# Patient Record
Sex: FEMALE | Race: BLACK OR AFRICAN AMERICAN
[De-identification: names, ages, dates, MRNs, and addresses within clinical notes are randomized per-mention and may not be internally consistent; named-entity substitution may affect disease eponyms.]

---

## 2017-02-15 ENCOUNTER — HOSPITAL ENCOUNTER (EMERGENCY)
Dept: HOSPITAL 62 - ER | Age: 27
Discharge: HOME | End: 2017-02-15
Payer: SELF-PAY

## 2017-02-15 VITALS — DIASTOLIC BLOOD PRESSURE: 57 MMHG | SYSTOLIC BLOOD PRESSURE: 99 MMHG

## 2017-02-15 DIAGNOSIS — J11.1: Primary | ICD-10-CM

## 2017-02-15 DIAGNOSIS — M79.1: ICD-10-CM

## 2017-02-15 DIAGNOSIS — Z88.0: ICD-10-CM

## 2017-02-15 DIAGNOSIS — R50.9: ICD-10-CM

## 2017-02-15 PROCEDURE — 99283 EMERGENCY DEPT VISIT LOW MDM: CPT

## 2017-02-15 PROCEDURE — 87804 INFLUENZA ASSAY W/OPTIC: CPT

## 2017-02-15 NOTE — ER DOCUMENT REPORT
[id number]   [id number]


Mode of Arrival: Ambulatory


Information source: Patient


Notes: 


26 yr old female presents with complaints of body aches, joint pain, fevers and 

cough of 2 day duration. pt notes she did not have influenza vaccine this year 

and works at day care with multiple flu exposures


TRAVEL OUTSIDE OF THE [place] IN LAST 30 DAYS: No





- HPI


Onset: Yesterday


Onset/Duration: Persistent


Quality of pain: Achy


Severity: Mild


Pain Level: 1


Associated symptoms: Body/muscle aches, Nonproductive cough


Exacerbated by: Denies


Relieved by: Denies


Similar symptoms previously: No


Recently seen / treated by doctor: No





- Related Data


Allergies/Adverse Reactions: 


 





Penicillins Allergy (Verified 02/15/17 11:16)


 











Past Medical History





- Social History


Smoking Status: Never Smoker


Cigarette use (# per day): No


Chew tobacco use (# tins/day): No


Smoking Education Provided: No


Frequency of alcohol use: None


Drug Abuse: None


Family History: Reviewed & Not Pertinent, Hypertension


Patient has suicidal ideation: No


Patient has homicidal ideation: No


Renal/ Medical History: Denies: Hx Peritoneal Dialysis


Psychiatric Medical History: Reports: Hx Anxiety


Past Surgical History: Reports: Hx Oral Surgery - wisdom teeth





- Immunizations


Immunizations up to date: Yes


Hx Diphtheria, Pertussis, Tetanus Vaccination: Yes - 2009





Review of Systems





- Review of Systems


Notes: 


REVIEW OF SYSTEMS:


CONSTITUTIONAL :  admits ot fevers 


EENT:  admtis t sore throat 


CARDIOVASCULAR:  Denies chest pain.  Denies palpitations or racing or irregular 

heart beat.  Denies ankle edema.


RESPIRATORY:  Denies cough, cold, or chest congestion.  Denies shortness of 

breath, difficulty breathing, or wheezing.


GASTROINTESTINAL:  Denies abdominal pain or distention.  Denies nausea, vomiting

, or diarrhea.  Denies blood in vomitus, stools, or per rectum.  Denies black, 

tarry stools.  Denies constipation. 


GENITOURINARY:  Denies difficulty urinating, painful urination, burning, 

frequency, blood in urine, or discharge.


FEMALE  GENITOURINARY:  Denies vaginal bleeding, heavy or abnormal periods, 

irregular periods.  Denies vaginal discharge or odor. 


MUSCULOSKELETAL:  admits to body aches 


SKIN:   Denies rash, lesions or sores.


HEMATOLOGIC :   Denies easy bruising or bleeding.


LYMPHATIC:  Denies swollen, enlarged glands.


NEUROLOGICAL:  Denies confusion or altered mental status.  Denies passing out 

or loss of consciousness.  Denies dizziness or lightheadedness.  Denies 

headache.  Denies weakness or paralysis or loss of use of either side.  Denies 

problems with gait or speech.  Denies sensory loss, numbness, or tingling.  

Denies seizures.


PSYCHIATRIC:  Denies anxiety or stress.  Denies depression, suicidal ideation, 

or homicidal ideation.





ALL OTHER SYSTEMS REVIEWED AND NEGATIVE.








Dictation was performed using Dragon voice recognition software 








PHYSICAL EXAMINATION:





GENERAL: Well-appearing, well-nourished and in no acute distress.





HEAD: Atraumatic, normocephalic.





EYES: Pupils equal round and reactive to light, extraocular movements intact, 

conjunctiva are normal.





ENT: Nares patent, oropharynx clear without exudates.  Moist mucous membranes.





NECK: Normal range of motion, supple without lymphadenopathy





LUNGS: Breath sounds clear to auscultation bilaterally and equal.  No wheezes 

rales or rhonchi.





HEART: Regular rate and rhythm without murmurs





ABDOMEN: Soft, nontender, nondistended abdomen.  No guarding, no rebound.  No 

masses appreciated.





Female : deferred





Musculoskeletal: Normal range of motion, no pitting or edema.  No cyanosis.





NEUROLOGICAL: Cranial nerves grossly intact.  Normal speech, normal gait.  

Normal sensory, motor exams





PSYCH: Normal mood, normal affect.





SKIN: Warm, Dry, normal turgor, no rashes or lesions noted.





Physical Exam





- Vital signs


Vitals: 


 











Temp Pulse Resp BP Pulse Ox


 


 98.2 F   95   12   108/60   100 


 


 02/15/17 10:53  02/15/17 10:53  02/15/17 10:53  02/15/17 10:53  02/15/17 10:53














Course





- Re-evaluation


Re-evalutation: 





02/15/17 15:57


Patient is positive for the fluid, given that is within 48 hours I will start 

the patient on Tamiflu and is otherwise stable for discharge








Patient given restrict return precautions











After performing a Medical Screening Examination, I estimate there is LOW risk 

for ACUTE CORONARY SYNDROME, RESPIRATORY FAILURE, SEPSIS OR MENINGITIS, thus I 

consider the discharge disposition reasonable. The patient and I have discussed 

the diagnosis and risks, and we agree with discharging home with close follow-

up. We also discussed returning to the Emergency Department immediately if new 

or worsening symptoms occur. We have discussed the symptoms which are most 

concerning (e.g., changing or worsening pain, trouble swallowing or breathing, 

neck stiffness, fever) that necessitate immediate return.





- Vital Signs


Vital signs: 


 











Temp Pulse Resp BP Pulse Ox


 


 98.3 F   79   20   99/57 L  97 


 


 02/15/17 14:02  02/15/17 14:02  02/15/17 14:02  02/15/17 14:02  02/15/17 14:02














Discharge





- Discharge


Clinical Impression: 


 Influenza, Body aches





Condition: Stable


Disposition: HOME, SELF-CARE


Instructions:  Influenza ([facility]) [id number]


Prescriptions: 


Oseltamivir Phosphate [Tamiflu 75 mg Capsule] 75 mg PO BID #10 capsule


Forms:  Return to Work

## 2017-02-15 NOTE — ER DOCUMENT REPORT
ED Medical Screen (RME)





- General


Stated Complaint: FEVER


Notes: 


25 yo female c/o bodyaches, cough, fever x 3 days.  no flu shot


TRAVEL OUTSIDE OF THE [place] IN LAST 30 DAYS: No





- Related Data


Allergies/Adverse Reactions: 


 





Penicillins Allergy (Verified 02/15/17 11:16)


 











Past Medical History


Psychiatric Medical History: Reports: Hx Anxiety





- Immunizations


Immunizations up to date: Yes


Hx Diphtheria, Pertussis, Tetanus Vaccination: Yes - 2009





Physical Exam





- Vital signs


Vitals: 





 











Temp Pulse Resp BP Pulse Ox


 


 98.2 F   95   12   108/60   100 


 


 02/15/17 10:53  02/15/17 10:53  02/15/17 10:53  02/15/17 10:53  02/15/17 10:53














Course





- Vital Signs


Vital signs: 





 











Temp Pulse Resp BP Pulse Ox


 


 98.2 F   95   12   108/60   100 


 


 02/15/17 10:53  02/15/17 10:53  02/15/17 10:53  02/15/17 10:53  02/15/17 10:53

## 2017-05-01 ENCOUNTER — HOSPITAL ENCOUNTER (OUTPATIENT)
Dept: HOSPITAL 62 - OD | Age: 27
End: 2017-05-01
Attending: OBSTETRICS & GYNECOLOGY
Payer: MEDICAID

## 2017-05-01 DIAGNOSIS — Z30.09: Primary | ICD-10-CM

## 2017-05-01 DIAGNOSIS — Z11.3: ICD-10-CM

## 2017-05-01 DIAGNOSIS — Z11.4: ICD-10-CM

## 2017-05-01 LAB
ADD HIVPANEL?: NO
HIV (1 AND 2) ANTIBODY: NEGATIVE

## 2017-05-01 PROCEDURE — 36415 COLL VENOUS BLD VENIPUNCTURE: CPT

## 2017-05-01 PROCEDURE — 86701 HIV-1ANTIBODY: CPT

## 2017-05-01 PROCEDURE — 86592 SYPHILIS TEST NON-TREP QUAL: CPT

## 2018-03-28 ENCOUNTER — HOSPITAL ENCOUNTER (EMERGENCY)
Dept: HOSPITAL 62 - ER | Age: 28
Discharge: HOME | End: 2018-03-28
Payer: MEDICAID

## 2018-03-28 VITALS — DIASTOLIC BLOOD PRESSURE: 59 MMHG | SYSTOLIC BLOOD PRESSURE: 109 MMHG

## 2018-03-28 DIAGNOSIS — M79.1: ICD-10-CM

## 2018-03-28 DIAGNOSIS — D72.829: ICD-10-CM

## 2018-03-28 DIAGNOSIS — H92.09: ICD-10-CM

## 2018-03-28 DIAGNOSIS — J02.9: Primary | ICD-10-CM

## 2018-03-28 DIAGNOSIS — R50.9: ICD-10-CM

## 2018-03-28 DIAGNOSIS — B34.9: ICD-10-CM

## 2018-03-28 LAB
ADD MANUAL DIFF: NO
BASOPHILS # BLD AUTO: 0.1 10^3/UL (ref 0–0.2)
BASOPHILS NFR BLD AUTO: 0.5 % (ref 0–2)
EOSINOPHIL # BLD AUTO: 0.1 10^3/UL (ref 0–0.6)
EOSINOPHIL NFR BLD AUTO: 1.2 % (ref 0–6)
ERYTHROCYTE [DISTWIDTH] IN BLOOD BY AUTOMATED COUNT: 14.2 % (ref 11.5–14)
HCT VFR BLD CALC: 41.6 % (ref 36–47)
HGB BLD-MCNC: 13.4 G/DL (ref 12–15.5)
LYMPHOCYTES # BLD AUTO: 1.2 10^3/UL (ref 0.5–4.7)
LYMPHOCYTES NFR BLD AUTO: 10.5 % (ref 13–45)
MCH RBC QN AUTO: 26.3 PG (ref 27–33.4)
MCHC RBC AUTO-ENTMCNC: 32.1 G/DL (ref 32–36)
MCV RBC AUTO: 82 FL (ref 80–97)
MONOCYTES # BLD AUTO: 1.1 10^3/UL (ref 0.1–1.4)
MONOCYTES NFR BLD AUTO: 9.9 % (ref 3–13)
NEUTROPHILS # BLD AUTO: 8.7 10^3/UL (ref 1.7–8.2)
NEUTS SEG NFR BLD AUTO: 77.9 % (ref 42–78)
PLATELET # BLD: 194 10^3/UL (ref 150–450)
RBC # BLD AUTO: 5.08 10^6/UL (ref 3.72–5.28)
TOTAL CELLS COUNTED % (AUTO): 100 %
WBC # BLD AUTO: 11.2 10^3/UL (ref 4–10.5)

## 2018-03-28 PROCEDURE — 87070 CULTURE OTHR SPECIMN AEROBIC: CPT

## 2018-03-28 PROCEDURE — 36415 COLL VENOUS BLD VENIPUNCTURE: CPT

## 2018-03-28 PROCEDURE — 85025 COMPLETE CBC W/AUTO DIFF WBC: CPT

## 2018-03-28 PROCEDURE — 87077 CULTURE AEROBIC IDENTIFY: CPT

## 2018-03-28 PROCEDURE — 87880 STREP A ASSAY W/OPTIC: CPT

## 2018-03-28 PROCEDURE — 99283 EMERGENCY DEPT VISIT LOW MDM: CPT

## 2018-03-28 NOTE — ER DOCUMENT REPORT
HPI





- HPI


Pain Level: 5


Context: 





Patient is a healthy 27-year-old female presenting to the ED complaining of 

acute onset of ear pain, sore throat, fever, body aches.  Patient denies cough, 

chest pain, shortness of breath, abdominal pain, nausea or vomiting


Associated Symptoms: Body/muscle aches, Chills, Earache, Fever, Headache, Sore 

throat.  denies: Shortness of breath


Exacerbated by: Denies


Relieved by: Denies


Similar symptoms previously: No


Recently seen / treated by doctor: No





- ROS


Systems Reviewed and Negative: Yes All other systems reviewed and negative





- CONSTITUTIONAL


Constitutional: REPORTS: Fever





- EENT


EENT: REPORTS: Ear Pain





- REPRODUCTIVE


Reproductive: DENIES: Pregnant:





Past Medical History





- General


Information source: Patient





- Social History


Smoking Status: Unknown if Ever Smoked


Frequency of alcohol use: None


Lives with: Family


Family History: Reviewed & Not Pertinent, Hypertension


Patient has suicidal ideation: No


Patient has homicidal ideation: No





- Medical History


Medical History: Negative


Renal/ Medical History: Denies: Hx Peritoneal Dialysis


Psychiatric Medical History: Reports: Hx Anxiety


Past Surgical History: Reports: Hx Oral Surgery - wisdom teeth





- Immunizations


Immunizations up to date: Yes


Hx Diphtheria, Pertussis, Tetanus Vaccination: Yes - 2009





Vertical Provider Document





- CONSTITUTIONAL


Agree With Documented VS: Yes





- INFECTION CONTROL


TRAVEL OUTSIDE OF THE [place] IN LAST 30 DAYS: No





- HEENT


HEENT: Atraumatic, Pharyngeal Tenderness, Pharyngeal Erythema, Tympanic 

Membrane Red - right TM injected





- NECK


Neck: Normal Inspection, Supple





- RESPIRATORY


Respiratory: Breath Sounds Normal, No Respiratory Distress





- CARDIOVASCULAR


Cardiovascular: Regular Rate, Regular Rhythm





- GI/ABDOMEN


Gastrointestinal: Abdomen Soft, Abdomen Non-Tender





- NEURO


Level of Consciousness: Awake, Alert, Appropriate





- DERM


Integumentary: Warm, Dry, No Rash





Course





- Re-evaluation


Re-evalutation: 





03/28/18 18:25


CBC showing mild leukocytosis.  Rapid strep is negative.  History and physical 

are consistent with a viral illness.  Symptomatic treatment will be 

recommended.  Discussed this plan with patient.  Patient agreeable and stable 

for discharge


03/28/18 18:28


Patient eating and drinking here in the emergency department without any 

difficulty





- Vital Signs


Vital signs: 


 











Temp Pulse Resp BP Pulse Ox


 


 99.9 F   88   12   103/51 L  99 


 


 03/28/18 16:50  03/28/18 16:50  03/28/18 16:50  03/28/18 16:50  03/28/18 16:50














- Laboratory


Result Diagrams: 


 03/28/18 17:35








Discharge





- Discharge


Clinical Impression: 


 Viral illness





Condition: Stable


Disposition: HOME, SELF-CARE


Instructions:  Fever ([facility]), Viral Syndrome ([facility])

## 2018-04-18 ENCOUNTER — HOSPITAL ENCOUNTER (EMERGENCY)
Dept: HOSPITAL 62 - ER | Age: 28
Discharge: HOME | End: 2018-04-18
Payer: MEDICAID

## 2018-04-18 VITALS — DIASTOLIC BLOOD PRESSURE: 49 MMHG | SYSTOLIC BLOOD PRESSURE: 103 MMHG

## 2018-04-18 DIAGNOSIS — J35.1: ICD-10-CM

## 2018-04-18 DIAGNOSIS — R09.89: ICD-10-CM

## 2018-04-18 DIAGNOSIS — R09.81: ICD-10-CM

## 2018-04-18 DIAGNOSIS — R05: ICD-10-CM

## 2018-04-18 DIAGNOSIS — J02.9: Primary | ICD-10-CM

## 2018-04-18 DIAGNOSIS — Z88.0: ICD-10-CM

## 2018-04-18 DIAGNOSIS — R09.82: ICD-10-CM

## 2018-04-18 DIAGNOSIS — Z87.891: ICD-10-CM

## 2018-04-18 DIAGNOSIS — J06.9: ICD-10-CM

## 2018-04-18 PROCEDURE — 99283 EMERGENCY DEPT VISIT LOW MDM: CPT

## 2018-04-18 PROCEDURE — 87880 STREP A ASSAY W/OPTIC: CPT

## 2018-04-18 PROCEDURE — 87070 CULTURE OTHR SPECIMN AEROBIC: CPT

## 2018-04-18 PROCEDURE — 87077 CULTURE AEROBIC IDENTIFY: CPT

## 2018-05-15 ENCOUNTER — HOSPITAL ENCOUNTER (EMERGENCY)
Dept: HOSPITAL 62 - ER | Age: 28
Discharge: HOME | End: 2018-05-15
Payer: MEDICAID

## 2018-05-15 VITALS — DIASTOLIC BLOOD PRESSURE: 78 MMHG | SYSTOLIC BLOOD PRESSURE: 122 MMHG

## 2018-05-15 DIAGNOSIS — I49.8: ICD-10-CM

## 2018-05-15 DIAGNOSIS — R07.9: Primary | ICD-10-CM

## 2018-05-15 LAB
ADD MANUAL DIFF: NO
ANION GAP SERPL CALC-SCNC: 15 MMOL/L (ref 5–19)
BASOPHILS # BLD AUTO: 0.1 10^3/UL (ref 0–0.2)
BASOPHILS NFR BLD AUTO: 1.3 % (ref 0–2)
BUN SERPL-MCNC: 15 MG/DL (ref 7–20)
CALCIUM: 9.8 MG/DL (ref 8.4–10.2)
CHLORIDE SERPL-SCNC: 104 MMOL/L (ref 98–107)
CO2 SERPL-SCNC: 26 MMOL/L (ref 22–30)
EOSINOPHIL # BLD AUTO: 0.4 10^3/UL (ref 0–0.6)
EOSINOPHIL NFR BLD AUTO: 7 % (ref 0–6)
ERYTHROCYTE [DISTWIDTH] IN BLOOD BY AUTOMATED COUNT: 14.3 % (ref 11.5–14)
GLUCOSE SERPL-MCNC: 74 MG/DL (ref 75–110)
HCT VFR BLD CALC: 43.8 % (ref 36–47)
HGB BLD-MCNC: 14.2 G/DL (ref 12–15.5)
LYMPHOCYTES # BLD AUTO: 2.2 10^3/UL (ref 0.5–4.7)
LYMPHOCYTES NFR BLD AUTO: 42.7 % (ref 13–45)
MCH RBC QN AUTO: 26.5 PG (ref 27–33.4)
MCHC RBC AUTO-ENTMCNC: 32.4 G/DL (ref 32–36)
MCV RBC AUTO: 82 FL (ref 80–97)
MONOCYTES # BLD AUTO: 0.4 10^3/UL (ref 0.1–1.4)
MONOCYTES NFR BLD AUTO: 6.9 % (ref 3–13)
NEUTROPHILS # BLD AUTO: 2.1 10^3/UL (ref 1.7–8.2)
NEUTS SEG NFR BLD AUTO: 42.1 % (ref 42–78)
PHOSPHATE SERPL-MCNC: 4.3 MG/DL (ref 2.5–4.5)
PLATELET # BLD: 246 10^3/UL (ref 150–450)
POTASSIUM SERPL-SCNC: 4 MMOL/L (ref 3.6–5)
RBC # BLD AUTO: 5.34 10^6/UL (ref 3.72–5.28)
SODIUM SERPL-SCNC: 144.5 MMOL/L (ref 137–145)
TOTAL CELLS COUNTED % (AUTO): 100 %
WBC # BLD AUTO: 5.1 10^3/UL (ref 4–10.5)

## 2018-05-15 PROCEDURE — 93010 ELECTROCARDIOGRAM REPORT: CPT

## 2018-05-15 PROCEDURE — 85025 COMPLETE CBC W/AUTO DIFF WBC: CPT

## 2018-05-15 PROCEDURE — 93005 ELECTROCARDIOGRAM TRACING: CPT

## 2018-05-15 PROCEDURE — 84100 ASSAY OF PHOSPHORUS: CPT

## 2018-05-15 PROCEDURE — 83735 ASSAY OF MAGNESIUM: CPT

## 2018-05-15 PROCEDURE — 84443 ASSAY THYROID STIM HORMONE: CPT

## 2018-05-15 PROCEDURE — 81025 URINE PREGNANCY TEST: CPT

## 2018-05-15 PROCEDURE — 80048 BASIC METABOLIC PNL TOTAL CA: CPT

## 2018-05-15 PROCEDURE — 71046 X-RAY EXAM CHEST 2 VIEWS: CPT

## 2018-05-15 PROCEDURE — 99285 EMERGENCY DEPT VISIT HI MDM: CPT

## 2018-05-15 PROCEDURE — 84484 ASSAY OF TROPONIN QUANT: CPT

## 2018-05-15 PROCEDURE — 36415 COLL VENOUS BLD VENIPUNCTURE: CPT

## 2018-05-15 NOTE — RADIOLOGY REPORT (SQ)
EXAM DESCRIPTION:  CHEST 2 VIEWS



COMPLETED DATE/TIME:  5/15/2018 3:53 pm



REASON FOR STUDY:  chest pain



COMPARISON:  None.



EXAM PARAMETERS:  NUMBER OF VIEWS: two views

TECHNIQUE: Digital Frontal and Lateral radiographic views of the chest acquired.

RADIATION DOSE: NA

LIMITATIONS: none



FINDINGS:  LUNGS AND PLEURA: No opacities, masses or pneumothorax. No pleural effusion.  Small nodula
r density is identified projected in the right mid hemithorax presumably representing a nipple shadow
.  If clinically warranted an oblique view of the chest or repeat PA chest with nipple markers is rec
ommended.

MEDIASTINUM AND HILAR STRUCTURES: No masses or contour abnormalities.

HEART AND VASCULAR STRUCTURES: Heart normal size.  No evidence for failure.

BONES: No acute findings.

HARDWARE: None in the chest.

OTHER: No other significant finding.



IMPRESSION:  Small nodular density on the right is noted above presumably representing a nipple shado
w however other etiologies cannot be completely excluded.  If clinically warranted a repeat film with
 nipple markers or shallow oblique view of the chest is recommended.  Other findings as noted above



TECHNICAL DOCUMENTATION:  JOB ID:  [id number]

 2011 [employer]- All Rights Reserved



Reading location - IP/workstation name: [facility]

## 2018-05-15 NOTE — ER DOCUMENT REPORT
ED Medical Screen (RME)





- General


Chief Complaint: Chest Pain


Stated Complaint: CHEST PAIN


Time Seen by Provider: 05/15/18 14:24


Notes: 





RAPID MEDICAL EVALUATION DISCLOSURE


I have seen this patient as part of a Rapid Medical Evaluation and, if 

applicable, placed any initially appropriate orders. The patient will be seen 

and fully evaluated, including a full history and physical exam, by a provider (

in Main ED or Fast Track) when a room becomes available.





------------------------------------------------------------------





27-year-old female here with several days of palpitations shortness of breath 

and chest tightness.  The symptoms are intermittent.  She has not noticed 

anything that makes them worse.  Specifically, breathing and exertion has not 

made them worse.  When the symptoms first started, she did not feel anxious, 

but now she is starting to feel anxious because she is scared about the 

symptoms.  She has a history of anxiety but does not take any medication for 

it.  She reports that when she has an anxiety attack, she does not normally 

have SOB chest pain or palpitations.  She denies any recent medication changes, 

cocaine use, excessive caffeine/energy drink intake.  She has no prior history 

of similar.





EXAM


CTAB


RRR








TRAVEL OUTSIDE OF THE [place] IN LAST 30 DAYS: No





- Related Data


Allergies/Adverse Reactions: 


 





Penicillins Allergy (Verified 04/18/18 10:47)


 











Past Medical History


Renal/ Medical History: Denies: Hx Peritoneal Dialysis


Psychiatric Medical History: Reports: Hx Anxiety


Past Surgical History: Reports: Hx Oral Surgery - wisdom teeth





- Immunizations


Immunizations up to date: Yes


Hx Diphtheria, Pertussis, Tetanus Vaccination: Yes - 2009





Physical Exam





- Vital signs


Vitals: 





 











Temp Pulse Resp BP Pulse Ox


 


 98.4 F   80   18   109/45 L  96 


 


 05/15/18 13:50  05/15/18 13:50  05/15/18 13:50  05/15/18 13:50  05/15/18 13:50














Course





- Vital Signs


Vital signs: 





 











Temp Pulse Resp BP Pulse Ox


 


 98.4 F   80   18   109/45 L  96 


 


 05/15/18 13:50  05/15/18 13:50  05/15/18 13:50  05/15/18 13:50  05/15/18 13:50

## 2018-05-15 NOTE — ER DOCUMENT REPORT
ED General





- General


Chief Complaint: Chest Pain


Stated Complaint: CHEST PAIN


Time Seen by Provider: 05/15/18 14:24


Notes: 


The patient is a 27-year-old female, past medical history anxiety (not on any 

medications), presents with intermittent feeling like her heart is fluttering.  

She has these episodes will occur randomly throughout the day and last a few 

seconds.  She does admit to increased stress, but denies current chest pain, 

leg swelling, caffeine use, cocaine abuse, OCP use, blurry vision or syncope.


TRAVEL OUTSIDE OF THE [place] IN LAST 30 DAYS: No





- Related Data


Allergies/Adverse Reactions: 


 





Penicillins Allergy (Verified 05/15/18 15:07)


 











Past Medical History





- General


Information source: Patient





- Social History


Smoking Status: Never Smoker


Chew tobacco use (# tins/day): No


Frequency of alcohol use: None


Drug Abuse: None


Family History: DM, Hypertension, Malignancy.  denies: Arthritis, CAD, COPD, CVA

, Hyperlipidemia, Thyroid Disfunction


Patient has suicidal ideation: No


Patient has homicidal ideation: No


Renal/ Medical History: Denies: Hx Peritoneal Dialysis


Psychiatric Medical History: Reports: Hx Anxiety


Past Surgical History: Reports: Hx Oral Surgery - wisdom teeth





- Immunizations


Immunizations up to date: Yes


Hx Diphtheria, Pertussis, Tetanus Vaccination: Yes - 2009





Review of Systems





- Review of Systems


Notes: 


REVIEW OF SYSTEMS:


CONSTITUTIONAL: -fevers, -chills


EENT: -eye pain, -difficulty swallowing, -nasal congestion


CARDIOVASCULAR: -chest pain, -syncope, +palpitations


RESPIRATORY: -cough, -SOB


GASTROINTESTINAL: -abdominal pain, -nausea, -vomiting, -diarrhea


GENITOURINARY: -dysuria, -hematuria


MUSCULOSKELETAL: -back pain, -neck pain


SKIN: -rash or skin lesions.


HEMATOLOGIC: -easy bruising or bleeding.


LYMPHATIC: -swollen, enlarged glands.


NEUROLOGICAL: -altered mental status or loss of consciousness, -headache, -

neurologic symptoms


PSYCHIATRIC: -anxiety, -depression.


ALL OTHER SYSTEMS REVIEWED AND NEGATIVE.





Physical Exam





- Vital signs


Vitals: 


 











Temp Pulse Resp BP Pulse Ox


 


 98.4 F   80   18   109/45 L  96 


 


 05/15/18 13:50  05/15/18 13:50  05/15/18 13:50  05/15/18 13:50  05/15/18 13:50














- Notes


Notes: 


PHYSICAL EXAMINATION:


GENERAL: Well-appearing, well-nourished and in no acute distress.


HEAD: Atraumatic, normocephalic.


EYES: Pupils equal round and reactive to light, extraocular movements intact, 

sclera anicteric, conjunctiva are normal.


ENT: nares patent, oropharynx clear without exudates.  Moist mucous membranes.


NECK: Normal range of motion, supple without lymphadenopathy


LUNGS: Breath sounds clear to auscultation bilaterally and equal.  No wheezes 

rales or rhonchi.


HEART: Regular rate and rhythm without murmurs


ABDOMEN: Soft, nontender, normoactive bowel sounds.  No guarding, no rebound.  

No masses appreciated.


EXTREMITIES: Normal range of motion, no pitting or edema.  No cyanosis.


NEUROLOGICAL: Cranial nerves grossly intact.  Normal speech, normal gait.  

Normal sensory and motor exams.


PSYCH: Normal mood, normal affect.


SKIN: Warm, Dry, normal turgor, no rashes or lesions noted.





Course





- Re-evaluation


Re-evalutation: 


Patient says she is feeling the fluttering episodes while she was on the 

monitor.  The monitor did not show any arrhythmias during these episodes.  EKG 

also did not show evidence of prolonged QT syndrome, WPW or Brugada.  Chest x-

ray shows a possible neoplasm or nipples.  With her age and lack of risk factors

, suspect that they are nipples and not a neoplasm.  Instructed her to follow-

up with cardiology and her primary care physician.  Also provided her with 

stress relieving exercises.  Given strict return precautions and she 

understands.





- Vital Signs


Vital signs: 


 











Temp Pulse Resp BP Pulse Ox


 


 98.0 F   78   18   122/78   99 


 


 05/15/18 16:43  05/15/18 16:43  05/15/18 16:43  05/15/18 16:43  05/15/18 16:43














- Laboratory


Result Diagrams: 


 05/15/18 14:42





 05/15/18 14:42


Laboratory results interpreted by me: 


 











  05/15/18 05/15/18





  14:42 14:42


 


RBC  5.34 H 


 


MCH  26.5 L 


 


RDW  14.3 H 


 


Eosinophils %  7.0 H 


 


Glucose   74 L














- Diagnostic Test


Radiology reviewed: Image reviewed, Reports reviewed


Radiology results interpreted by me: 


CXR: NAD





- EKG Interpretation by Me


EKG shows normal: Sinus rhythm, Axis, Intervals, QRS Complexes, ST-T Waves


Rate: Normal





Discharge





- Discharge


Clinical Impression: 


 Fluttering sensation of heart





Condition: Stable


Disposition: HOME, SELF-CARE


Additional Instructions: 


CHEST PAIN OF UNCLEAR CAUSE:





     The exact cause of your chest pain isn't clear.  Fortunately, there is no 

evidence of a dangerous medical condition.  Further testing may be required to 

find the source of the pain.


     Most often, we find that this pain is coming from the chest wall -- the 

muscles or rib joints in the chest.  But chest pain can come from the lung and 

lung lining, the esophagus, the heart valves or heart lining, and even the 

stomach or gallbladder.


     Rest.  Eat lightly until the pain is gone.  We may prescribe medicine for 

pain and inflammation.


     You should call the physician immediately if the pain radiates to the 

shoulder, jaw or arms; if you start to run a fever or develop a cough; or if 

you develop shortness of breath, or other new or alarming symptoms.








NORMAL EXAM AND WORKUP:


     At this time, your examination and workup show no significant abnormality.

  No significant abnormal physical findings were noted.  All laboratory, EKG, 

and imaging (x-ray, CT scans, ultrasound) studies that were ordered show no 

significant abnormality.


     Although your examination and all studies that were ordered showed no 

significant abnormal finding, there are no examinations and no studies that are 

100% accurate.  There is always the possibility that some abnormality could 

exist and not be detected with physical examination or within the limits and 

capabilities of laboratory and other studies.


     You should return or follow up as you were instructed on your visit today 

for further evaluation if your symptoms do not resolve.








FOLLOW-UP CARE:


If you have been referred to a physician for follow-up care, call the physician

s office for an appointment as you were instructed or within the next two days.

  If you experience worsening or a significant change in your symptoms, notify 

the physician immediately or return to the Emergency Department at any time for 

re-evaluation.





Palpitations (Irregular/Rapid Heartrate)





     Irregular or rapid heartbeat is called "palpitation."  To diagnose the 

cause of palpitation, we have to "catch it in the act" with an EKG.


     Sinus Tachycardia:  This is a rapid (but NORMAL) rhythm that can be due to 

fever, pain, anxiety, lack of sleep, over-exertion, or drugs. Cold medications, 

caffeine, and diet pills are particularly likely to cause tachycardia.  Usually

, all that's required is rest, reassurance, and avoiding caffeine, alcohol, 

nicotine, and unnecessary medicines.


     Paroxysmal Atrial Tachycardia (PAT):  This abnormally rapid heartbeat is 

caused by a "short circuit" in the electrical system of the heart.  It is not 

dangerous, unless other heart disease is present. These attacks of PAT may 

occur occasionally for years.  Medication is available for treatment.


     Paroxysmal Atrial Fibrillation or Atrial Flutter:  This is irregular 

electrical activity in the upper heart chamber.  These abnormal rhythms often 

occur with valve disease or in hearts damaged by hardening of the arteries.  

These rhythms usually require further testing, for example a cardiac echo.


     Premature Beats:  Extra beats occur more commonly after caffeine, nicotine

, alcohol, cold pills, diet pills.  Emotional stress or fatigue also provoke 

them.  Extra beats are only dangerous when heart disease is present.  They 

usually need no treatment.  If they're frequent, or if evidence of heart 

disease develops, medication can be given to suppress them.


     If we were unable to "catch" the palpitations on EKG, you should try to 

get an EKG immediately if the symptoms begin again.  Contact the physician at 

once if you develop persistent lightheadedness, shortness of breath, chest pain

, or swelling of the ankles.


Referrals: 


GUNNAR MUELLER MD [ACTIVE STAFF] - Follow up as needed

## 2018-06-21 ENCOUNTER — HOSPITAL ENCOUNTER (EMERGENCY)
Dept: HOSPITAL 62 - ER | Age: 28
Discharge: HOME | End: 2018-06-21
Payer: MEDICAID

## 2018-06-21 VITALS — DIASTOLIC BLOOD PRESSURE: 58 MMHG | SYSTOLIC BLOOD PRESSURE: 106 MMHG

## 2018-06-21 DIAGNOSIS — L08.89: Primary | ICD-10-CM

## 2018-06-21 DIAGNOSIS — H93.8X2: ICD-10-CM

## 2018-06-21 DIAGNOSIS — H92.02: ICD-10-CM

## 2018-06-21 DIAGNOSIS — L72.9: ICD-10-CM

## 2018-06-21 PROCEDURE — 69020 DRG XTRNL AUD CANAL ABSCESS: CPT

## 2018-06-21 PROCEDURE — 99283 EMERGENCY DEPT VISIT LOW MDM: CPT

## 2018-06-21 PROCEDURE — 0H93XZZ DRAINAGE OF LEFT EAR SKIN, EXTERNAL APPROACH: ICD-10-PCS | Performed by: EMERGENCY MEDICINE

## 2018-06-21 NOTE — ER DOCUMENT REPORT
ED ENT





- General


Chief Complaint: Ear Pain


Stated Complaint: LEFT EAR PAIN


Time Seen by Provider: 06/21/18 21:06


Mode of Arrival: Ambulatory


Information source: Patient


TRAVEL OUTSIDE OF THE [place] IN LAST 30 DAYS: No





- HPI


Patient complains to provider of: Ear problem


Notes: 





Patient states she has had some pain in the left ear canal for the last few 

days.  Today it seemed to get worse now she is having pain and swelling outside 

of the ear.  No fever.  No drainage.  No nausea, vomiting, diarrhea.  She 

denies any injury.  She denies any chest pain or shortness of breath.  No 

headache.  No neck pain or swelling.  No other complaints.  Pain is worse with 

touching the area, nothing makes it better.





- Related Data


Allergies/Adverse Reactions: 


 





Penicillins Allergy (Verified 05/15/18 15:07)


 











Past Medical History





- Social History


Smoking Status: Never Smoker


Chew tobacco use (# tins/day): No


Frequency of alcohol use: Occasional


Drug Abuse: None


Family History: DM, Hypertension, Malignancy.  denies: Arthritis, CAD, COPD, CVA

, Hyperlipidemia, Thyroid Disfunction


Patient has suicidal ideation: No


Patient has homicidal ideation: No


Renal/ Medical History: Denies: Hx Peritoneal Dialysis


Psychiatric Medical History: Reports: Hx Anxiety


Past Surgical History: Reports: Hx Oral Surgery - wisdom teeth





- Immunizations


Immunizations up to date: Yes


Hx Diphtheria, Pertussis, Tetanus Vaccination: Yes - 2009





Review of Systems





- Review of Systems


-: Yes All other systems reviewed and negative





Physical Exam





- Vital signs


Vitals: 





 











Temp Pulse Resp BP Pulse Ox


 


 98.7 F   82   17   108/43 L  100 


 


 06/21/18 21:02  06/21/18 21:02  06/21/18 21:02  06/21/18 21:02  06/21/18 21:02














- Notes


Notes: 





GENERAL: alert, cooperative, nontoxic, no distress.


HEAD: normocephalic, atraumatic


EYES: conjunctiva pink without discharge, no external redness or swelling.


EARS: Slight swelling to the left tragus with small cystic structure that is 

tender just to the entrance of the left ear canal.  TM is unremarkable.  There 

is no redness or swelling to the mastoid.  Right ear is normal.


NOSE: atraumatic, no external swelling


MOUTH/THROAT: mucous membranes moist and pink


NECK: soft, supple, full range of motion, no meningismus.


CHEST: no distress, lungs clear and equal throughout.  No wheezing, rales, 

rhonchi.


CARDIAC: regular rate and rhythm, no murmur, normal capillary refill, normal 

pulses.  


BACK: full range of motion, no CVA tenderness.


EXTREMITIES: full range of motion of all extremities.  No redness, no swelling.


NEURO: alert and oriented 3, no focal deficits, full range of motion of all 

extremities.


PYSCH: appropriate mood, affect.  Patient is cooperative.


SKIN: pink, warm, dry, no rash.








Course





- Re-evaluation


Re-evalutation: 





06/21/18 22:21


Patient is nontoxic-appearing with stable vitals.  She with complaints of left 

ear pain.  She is noted to have what appears to be an infected cyst at the 

entrance of her left ear canal.  She does have some mild swelling just in front 

of the left tragus as well.  There is no significant swelling or tenderness of 

the face.  She is afebrile.  She is not diabetic.  She looks well otherwise.  

Patient was given pain medication and Bactrim here in the emergency department.

  I was able to I&D this infected cyst and obtained a small amount of purulent 

cystic type material.  Patient tolerated procedure well.  This point the 

patient will be discharged home with a prescription for Norco, Bactrim, 

instructions to apply warm compresses to the sore area.  She will be given a 

referral to ENT.  She is instructed to follow-up with ENT at the next available 

appointment.  Follow-up sooner for increasing pain, fever, numbness, tingling, 

weakness, redness, swelling, persistent vomiting, or for any further concerns.





The patient's emergency department workup and current diagnosis were explained 

to the patient and or family.  Follow-up instructions were provided.  

Medications if prescribed were discussed. Instructions for when to return to 

the emergency department including specific  worrisome symptoms were discussed 

with the patient and/or family.





- Vital Signs


Vital signs: 





 











Temp Pulse Resp BP Pulse Ox


 


 98.7 F   82   17   108/43 L  100 


 


 06/21/18 21:02  06/21/18 21:02  06/21/18 21:02  06/21/18 21:02  06/21/18 21:02














Procedures





- Incision and Drainage


  ** Left ear canal


Type: Simple


Anesthetic type: 1% Lidocaine


Blade size: 11


I&D procedure: Chantell applied


Incision Method: Incision made by scalpel


Amount/type of drainage: Small purulent cystic material


Notes: 





06/21/18 22:23


Patient tolerated procedure well with no immediate complications.  Hemostats 

were used to break up any potential loculations.





Discharge





- Discharge


Clinical Impression: 


 Infected cyst of skin





Condition: Stable


Disposition: HOME, SELF-CARE


Instructions:  Abscess ([facility])


Additional Instructions: 


Take medication as prescribed.  Apply warm compresses to the sore area.  Follow-

up with ENT at the next available appointment.  Follow-up sooner for increasing 

pain, fever, redness, swelling, persistent vomiting, or for any further 

concerns.


Prescriptions: 


Hydrocodone/Acetaminophen [Norco 5-325 mg Tablet] 2 tab PO Q6H PRN #10 tab


 PRN Reason: 


Sulfamethoxazole/Trimethoprim [Bactrim Ds Tablet] 1 each PO BID #20 tablet


Forms:  Smoking Cessation Education


Referrals: 


YOVANI RAY DO [ASSOCIATE] - Follow up as needed

## 2018-10-17 ENCOUNTER — HOSPITAL ENCOUNTER (EMERGENCY)
Dept: HOSPITAL 62 - ER | Age: 28
Discharge: HOME | End: 2018-10-17
Payer: MEDICAID

## 2018-10-17 VITALS — DIASTOLIC BLOOD PRESSURE: 64 MMHG | SYSTOLIC BLOOD PRESSURE: 102 MMHG

## 2018-10-17 DIAGNOSIS — K21.0: Primary | ICD-10-CM

## 2018-10-17 DIAGNOSIS — R11.2: ICD-10-CM

## 2018-10-17 DIAGNOSIS — R10.9: ICD-10-CM

## 2018-10-17 PROCEDURE — 99282 EMERGENCY DEPT VISIT SF MDM: CPT

## 2018-10-17 NOTE — ER DOCUMENT REPORT
HPI





- HPI


Patient complains to provider of: sore in left ear


Onset: Other - several days


Onset/Duration: Gradual


Pain Level: 3


Context: 





29 yo female with sore in base of left ear canal for feveral days. was told to 

return if she got it back again.


Associated Symptoms: None


Exacerbated by: Movement


Relieved by: Denies


Similar symptoms previously: Yes


Recently seen / treated by doctor: No





- ROS


ROS below otherwise negative: Yes


Systems Reviewed and Negative: Yes All other systems reviewed and negative





- REPRODUCTIVE


Reproductive: DENIES: Pregnant:





Past Medical History





- General


Information source: Patient





- Social History


Smoking Status: Unknown if Ever Smoked


Lives with: Family


Family History: DM, Hypertension, Malignancy


Renal/ Medical History: Denies: Hx Peritoneal Dialysis


Psychiatric Medical History: Reports: Hx Anxiety


Past Surgical History: Reports: Hx Oral Surgery - wisdom teeth





- Immunizations


Immunizations up to date: Yes


Hx Diphtheria, Pertussis, Tetanus Vaccination: Yes - 2009





Vertical Provider Document





- CONSTITUTIONAL


Agree With Documented VS: Yes


Exam Limitations: No Limitations


General Appearance: No Apparent Distress





- INFECTION CONTROL


TRAVEL OUTSIDE OF THE [place] IN LAST 30 DAYS: No





- HEENT


Notes: 





open coemdone 2 mm base of left ear canal-external portion. no abscess





Course





- Vital Signs


Vital signs: 


 











Temp Pulse Resp BP Pulse Ox


 


 98.4 F   77   16   102/64   99 


 


 10/17/18 11:05  10/17/18 11:05  10/17/18 11:05  10/17/18 11:05  10/17/18 11:05














Discharge





- Discharge


Clinical Impression: 


 left ear open comedone





Condition: Good


Disposition: HOME, SELF-CARE


Additional Instructions: 


You are seen today for a open comedone or pimple in the base of your left ear 

canal.








warm compress


bactroban small amount 3 times per day for 5 days


return if increased paink swelling fever


see the dermatololgist


Referrals: 


MADHU PRADHAN,  [ACTIVE STAFF] - Follow up as needed

## 2019-11-30 ENCOUNTER — HOSPITAL ENCOUNTER (EMERGENCY)
Dept: HOSPITAL 62 - ER | Age: 29
Discharge: HOME | End: 2019-11-30
Payer: MEDICAID

## 2019-11-30 VITALS — DIASTOLIC BLOOD PRESSURE: 66 MMHG | SYSTOLIC BLOOD PRESSURE: 131 MMHG

## 2019-11-30 DIAGNOSIS — H66.90: Primary | ICD-10-CM

## 2019-11-30 DIAGNOSIS — H60.509: ICD-10-CM

## 2019-11-30 DIAGNOSIS — F17.200: ICD-10-CM

## 2019-11-30 PROCEDURE — 99282 EMERGENCY DEPT VISIT SF MDM: CPT

## 2019-11-30 NOTE — ER DOCUMENT REPORT
HPI





- HPI


Time Seen by Provider: 11/30/19 17:00


Pain Level: 2


Notes: 





Otherwise healthy 29-year-old female presenting with left ear pain x2 weeks.  

Patient reports it feels like there is something clogged.  Patient denies any 

fevers or drainage.








- REPRODUCTIVE


Reproductive: DENIES: Pregnant:





Past Medical History





- General


Information source: Patient





- Social History


Smoking Status: Current Every Day Smoker


Chew tobacco use (# tins/day): No


Frequency of alcohol use: None


Drug Abuse: None


Family History: DM, Hypertension, Malignancy


Patient has suicidal ideation: No


Patient has homicidal ideation: No


Renal/ Medical History: Denies: Hx Peritoneal Dialysis


Psychiatric Medical History: Reports: Hx Anxiety


Past Surgical History: Reports: Hx Oral Surgery - wisdom teeth





- Immunizations


Immunizations up to date: Yes


Hx Diphtheria, Pertussis, Tetanus Vaccination: Yes - 2009





Vertical Provider Document





- CONSTITUTIONAL


Notes: 





PHYSICAL EXAMINATION:





GENERAL: Well-appearing, well-nourished and in no acute distress.





HEAD: Atraumatic, normocephalic.





EYES: Pupils equal round extraocular movements intact,  conjunctiva are normal.





ENT: Nares patent, left TM bulging and erythematous, left ear canal macerated 

and erythematous.  Right ear appears unremarkable.





NECK: Normal range of motion





LUNGS: No respiratory distress





Musculoskeletal: Normal range of motion





NEUROLOGICAL:  Normal speech, normal gait. 





PSYCH: Normal mood, normal affect.





SKIN: Warm, Dry, normal turgor, no rashes or lesions noted.





- INFECTION CONTROL


TRAVEL OUTSIDE OF THE [place] IN LAST 30 DAYS: No





Course





- Re-evaluation


Re-evalutation: 





Exam consistent with otitis externa and otitis media will be started on 

appropriate medications discussed ED return precautions.








- Vital Signs


Vital signs: 


                                        











Temp Pulse Resp BP Pulse Ox


 


 98.3 F   85   20   131/66 H  99 


 


 11/30/19 16:29  11/30/19 16:29  11/30/19 16:29  11/30/19 16:29  11/30/19 16:29














Discharge





- Discharge


Clinical Impression: 


Otitis media


Qualifiers:


 Otitis media type: unspecified Chronicity: acute Qualified Code(s): H66.90 - 

Otitis media, unspecified, unspecified ear





Otitis externa


Qualifiers:


 Otitis externa type: unspecified type Chronicity: acute Laterality: unspecified

laterality Qualified Code(s): H60.509 - Unspecified acute noninfective otitis 

externa, unspecified ear





Condition: Stable


Disposition: HOME, SELF-CARE


Additional Instructions: 


You have been diagnosed as having an ear infection.  Please take the medications

as prescribed.  Follow-up with your primary care provider or your nose and 

throat doctor as needed.  Return if you become lethargic, have persistent 

vomiting, become confused, have facial swelling, worsening pain despite 

antibiotics, or any other symptoms that are concerning to you.  Please also take

Tylenol and ibuprofen as directed on the bottle.





Prescriptions: 


Azithromycin [Zithromax 250 mg Tablet] 250 mg PO ASDIR PRN #6 tablet


 PRN Reason: 


Referrals: 


MERVIN NOLAN, CAMACHOP-C [Primary Care Provider] - Follow up as needed

## 2022-08-15 NOTE — ER DOCUMENT REPORT
Department of Anesthesiology  Postprocedure Note    Patient: Shimon Kirkpatrick  MRN: [id number]  [place]: 1986  Date of evaluation: 8/15/2022      Procedure Summary     Date: 08/15/22 Room / Location: [facility] ENDO 02 / [address]    Anesthesia Start: 1012 Anesthesia Stop: 8750    Procedure: EGD BIOPSY (Esophagus) Diagnosis:       Non-cardiac chest pain      Epigastric pain      (Non-cardiac chest pain [R07.89])    Surgeons: Shameka Grubbs MD Responsible Provider: RAMIRO Veronica CRNA    Anesthesia Type: General ASA Status: 2          Anesthesia Type: General    [place]orence Phase I:      Florence Phase II:        Anesthesia Post Evaluation    Patient location during evaluation: bedside  Patient participation: complete - patient participated  Level of consciousness: awake  Pain score: 0  Airway patency: patent  Nausea & Vomiting: no nausea and no vomiting  Complications: no  Cardiovascular status: hemodynamically stable  Respiratory status: acceptable, room air and spontaneous ventilation  Hydration status: euvolemic ED ENT





- General


Chief Complaint: Sore Throat


Stated Complaint: SORE THROAT


Time Seen by Provider: 04/18/18 11:08


Mode of Arrival: Ambulatory


Information source: Patient


Notes: 





27-year-old female presented ED for complaint of sore throat 24 hours.  She 

states she has also had a runny nose cough congestion.  She states she had a 

bit up this morning but was not vomiting.  Alert oriented speaking in full 

sentences pupils 3 equal and react to light walks with a even steady gait.


TRAVEL OUTSIDE OF THE [place] IN LAST 30 DAYS: No





- HPI


Patient complains to provider of: Nose problem, Throat problem


Onset: Yesterday


Onset/Duration: Gradual, Worse


Quality of pain: Sharp


Severity: Moderate


Pain Level: 3


Location of pain: Nose, Sinus, Throat


Associated symptoms: Runny nose, Sinus pain, Sinus drainage, Sore throat


Similar symptoms previously: Yes


Recently seen / treated by doctor: No





- Related Data


Allergies/Adverse Reactions: 


 





Penicillins Allergy (Verified 04/18/18 10:47)


 











Past Medical History





- General


Information source: Patient





- Social History


Smoking Status: Former Smoker


Cigarette use (# per day): No


Chew tobacco use (# tins/day): No


Smoking Education Provided: No


Frequency of alcohol use: Occasional


Drug Abuse: None


Occupation: 


Lives with: Grandparent(s)


Family History: DM, Hypertension, Malignancy.  denies: Arthritis, CAD, COPD, CVA

, Hyperlipidemia, Thyroid Disfunction


Patient has suicidal ideation: No


Patient has homicidal ideation: No





- Past Medical History


Cardiac Medical History: Reports: None


Pulmonary Medical History: Reports: None


EENT Medical History: Reports: None


Neurological Medical History: Reports: None


Endocrine Medical History: Reports: None


Renal/ Medical History: Reports: None


Malignancy Medical History: Reports: None


GI Medical History: Reports: None


Musculoskeltal Medical History: Reports None


Skin Medical History: Reports None


Psychiatric Medical History: Reports: Hx Anxiety


Traumatic Medical History: Reports: None


Infectious Medical History: Reports: None


Surgical Hx: Negative


Past Surgical History: Reports: None, Hx Oral Surgery - wisdom teeth





- Immunizations


Immunizations up to date: Yes


Hx Diphtheria, Pertussis, Tetanus Vaccination: Yes - 2009





Review of Systems





- Review of Systems


Constitutional: Recent illness


EENT: Nose congestion, Nose discharge, Sinus pressure, Sinus discharge, Throat 

pain


Cardiovascular: No symptoms reported


Respiratory: No symptoms reported


Gastrointestinal: No symptoms reported


Genitourinary: No symptoms reported


Female Genitourinary: No symptoms reported


Musculoskeletal: No symptoms reported


Skin: No symptoms reported


Hematologic/Lymphatic: No symptoms reported


Neurological/Psychological: No symptoms reported


-: Yes All other systems reviewed and negative





Physical Exam





- Vital signs


Vitals: 


 











Temp Pulse Resp BP Pulse Ox


 


 98.6 F   69   16   103/49 L  100 


 


 04/18/18 10:49  04/18/18 10:49  04/18/18 10:49  04/18/18 10:49  04/18/18 10:49











Interpretation: Normal





- General


General appearance: Appears well, Alert





- HEENT


Head: Normocephalic, Atraumatic


Eyes: Normal


Pupils: PERRL


Ears: Normal


External canal: Normal


Tympanic membrane: Normal


Nasal: Swelling


Mouth/Lips: Normal


Mucous membranes: Normal


Pharynx: Erythema, Post nasal drainage, Tonsillar hypertrophy.  No: Exudate, 

Retropharyngeal abscess, Uvular edema, Potential airway comprom.


Neck: Anterior cervical chain





- Respiratory


Respiratory status: No respiratory distress


Chest status: Nontender


Breath sounds: Nonproductive cough


Chest palpation: Normal





- Cardiovascular


Rhythm: Regular


Heart sounds: Normal auscultation


Murmur: No





- Abdominal


Inspection: Normal


Distension: No distension


Bowel sounds: Normal


Tenderness: Nontender


Organomegaly: No organomegaly





- Back


Back: Normal, Nontender





- Extremities


General upper extremity: Normal inspection, Nontender, Normal color, Normal ROM

, Normal temperature


General lower extremity: Normal inspection, Nontender, Normal color, Normal ROM

, Normal temperature, Normal weight bearing.  No: Peyton's sign





- Neurological


Neuro grossly intact: Yes


Cognition: Normal


Orientation: AAOx4


[place] Coma Scale Eye Opening: Spontaneous


[place] Coma Scale Verbal: Oriented


[place] Coma Scale Motor: Obeys Commands


[place] Coma Scale Total: 15


Speech: Normal


Motor strength normal: LUE, RUE, LLE, RLE


Sensory: Normal





- Psychological


Associated symptoms: Normal affect, Normal mood





- Skin


Skin Temperature: Warm


Skin Moisture: Dry


Skin Color: Normal





Course





- Re-evaluation


Re-evalutation: 





04/18/18 15:05


Patient was treated with Decadron and Toradol for the sore throat strep test 

came back negative.  She was then treated with Claritin and Sudafed and 

Mucinex.  Patient was given instructions for upper respiratory infection and 

discharged home.


After performing a Medical Screening Examination, I estimate there is LOW risk 

for ACUTE CORONARY SYNDROME, RESPIRATORY FAILURE, SEPSIS OR MENINGITIS, thus I 

consider the discharge disposition reasonable.  I have reevaluated this patient 

multiple times and no significant life threatening changes are noted. The 

patient and I have discussed the diagnosis and risks, and we agree with 

discharging home with close follow-up. We also discussed returning to the 

Emergency Department immediately if new or worsening symptoms occur. We have 

discussed the symptoms which are most concerning (e.g., changing or worsening 

pain, trouble swallowing or breathing, neck stiffness, fever) that necessitate 

immediate return.





- Vital Signs


Vital signs: 


 











Temp Pulse Resp BP Pulse Ox


 


 98.6 F   69   16   103/49 L  100 


 


 04/18/18 10:49  04/18/18 10:49  04/18/18 10:49  04/18/18 10:49  04/18/18 10:49














Discharge





- Discharge


Clinical Impression: 


 Sore throat





URI (upper respiratory infection)


Qualifiers:


 URI type: unspecified URI Qualified Code(s): J06.9 - Acute upper respiratory 

infection, unspecified





Condition: Stable


Disposition: HOME, SELF-CARE


Instructions:  Family Physicians / Practices


Additional Instructions: 


SORE THROAT:





     Sore throats may be caused by viruses, bacteria, or fungi.  Most are due 

to a virus, and must get better on their own.  Bacterial sore throats, 

particularly those due to "strep," need treatment with antibiotics.


     If an antibiotic is prescribed, be sure to take the medication for a full 

10 days.  Failure to take the antibiotic can result in complications such as 

rheumatic fever.  Sometimes, an injection of antibiotics is given instead of 

pills or liquid.  This single "shot" is equal in effectiveness to the oral 

medication.


     To relieve symptoms, take acetaminophen for pain.  Sip clear liquids 

frequently, or eat popsicles or ice chips.  Anesthetic sprays or lozenges may 

help.  Make sure the air in the room is not too dry. Avoid using decongestants 

or antihistamines.


     Call the doctor if there is no improvement in two days, or if you have 

difficulty breathing, increasing throat pain, high fever, rash, or frequent 

vomiting.





UPPER RESPIRATORY ILLNESS:





     You have a viral infection of the respiratory passages -- a "cold."  This 

common infection causes nasal congestion, drainage, and often sore throat and 

cough.  It is highly contagious.  The disease usually lasts about 10 to 14 days.


     There is no "cure" for the viral infection -- it must run its course.  If 

there is a complication, such as bacterial infection in the nose, sinuses, 

middle ear, or bronchial tubes, antibiotics may be required.  The antibiotics 

won't affect the virus.


     Drink plenty of fluids.  A humidifier may help.  An expectorant medication 

or decongestant may make you more comfortable.  Use acetaminophen or ibuprofen 

for fever or aches.


     See the doctor if fever persists over two days, if there is any 

significant worsening of your symptoms, or if you simply fail to improve as 

expected.








DECONGESTANT MEDICATION:


     A decongestant medicine has been .  Often this medicine is combined in the 

same tablet with an antihistamine or expectorant. This type of medicine is 

helpful in treating a bad cold or sinus condition, as well as in treatment of 

the nasal congestion of hay fever.  It is not of much benefit for lung 

infections.


     Decongestant medicines are related to stimulants.  They can cause an 

increase in blood pressure and heart rate.  Persons with heart disease and high 

blood pressure should not take decongestants without discussing this with the 

physician.


     If you develop palpitations, chest pain, headache, or tremors, stop the 

medicine and consult your physician.








COUGH-SUPPRESSANT & EXPECTORANT MEDICATION:


     You are to use a cough medication as needed for relief of symptoms.  This 

medicine is a combination of an expectorant (to make the mucous thinner and 

more easily "coughed up") and a cough suppressant (to reduce the frequency of 

coughing).


     The cough-suppressant medicine is related to narcotics.  You may 

experience mild nausea and sleepiness.  Some patients who are very sensitive to 

narcotics may have stomach pain from this medicine. Taking the medicine with 

food reduces these side effects.  Do not drive or work with machinery until you 

know how this medicine affects you.


     The expectorant should have no side effects.  Iodine-containing 

expectorants (such as organidin) should not be taken by persons with active 

thyroid disease unless approved by your doctor.


     Call the doctor if you develop shortness of breath, hives, rash, itching, 

lightheadedness, or severe nausea and vomiting.








USE OF ACETAMINOPHEN (Tylenol):


     Acetaminophen may be taken for pain relief or fever control. It's much 

safer than aspirin, offering a wider range of "safe" dosages.  It is safe 

during pregnancy.  Some brand names are Tylenol, Panadol, Datril, Anacin 3, 

Tempra, and Liquiprin. Acetaminophen can be repeated every four hours.  The 

following are maximum recommended dosages:


>89 pounds or adults          650 mg to 900 mg


Acetaminophen can be repeated every four hours.  Maximum dose not to exceed 

4000 mg a day.








Toradol Injection





     You have been given an injection of ketorolac tromethamine (Toradol).  

This is an excellent, safe drug for pain control.  It also has potent 

antiinflammatory action.  You should have significant pain relief within about 

one hour.


     Toradol is not addicting and is non-sedating.  It does not interfere with 

driving or work.


     Call or return if you develop itching, hives, shortness of breath, or rash.





STEROID MEDICATION:


     You have been given a medicine of the cortisone/steroid class.  This 

medication is used to control inflammation or allergy.  It is usually only 

given for a short period of time, until the acute process subsides.


     There are usually no side effects from short-term use of cortisone-like 

medications.  Some persons feel an increased sense of well-being and are not 

sleepy at bedtime.  Long-term use of cortisone medications is best avoided, 

unless required for a severe condition.  If your condition does not remit, or 

relapses after the course of corticosteroid medication, you should consult your 

physician.





You will treated today with Claritin 10 mg, Sudafed 30 mg, Mucinex 600 mg, 

Toradol 30 mg IM, and Decadron 10 mg IM for your cough cold congestion sore 

throat.  These are all over-the-counter except for the Toradol and Decadron 

dose or a one-time dose.  You cannot take any ibuprofen for the next 8 hours 

after getting this Toradol.  Other things he can help for your cough cold 

symptoms are Flonase nasal spray which you can also buy over-the-counter follow 

the directions on the box and salt and soda solution gargles will also help 

your sore throat.  Please call and follow-up with your primary doctor.





Salt and soda solution


1 quart of water


1 tablespoon of salt


1 teaspoon of baking soda


Mixed 3 ingredients together and boil for 1 minute


Placed in a covered quart jar


Use 1/2 ounce of cold solution to gargle 3 times a day





FOLLOW-UP CARE:


If you have been referred to a physician for follow-up care, call the physician

s office for an appointment as you were instructed or within the next two days.

  If you experience worsening or a significant change in your symptoms, notify 

the physician immediately or return to the Emergency Department at any time for 

re-evaluation.





Forms:  Return to Work